# Patient Record
Sex: FEMALE | Race: WHITE | URBAN - METROPOLITAN AREA
[De-identification: names, ages, dates, MRNs, and addresses within clinical notes are randomized per-mention and may not be internally consistent; named-entity substitution may affect disease eponyms.]

---

## 2018-03-23 ENCOUNTER — DOCUMENTATION ONLY (OUTPATIENT)
Dept: SURGERY | Age: 33
End: 2018-03-23

## 2018-03-23 ENCOUNTER — OFFICE VISIT (OUTPATIENT)
Dept: SURGERY | Age: 33
End: 2018-03-23

## 2018-03-23 VITALS
WEIGHT: 175 LBS | BODY MASS INDEX: 29.16 KG/M2 | SYSTOLIC BLOOD PRESSURE: 113 MMHG | HEART RATE: 63 BPM | DIASTOLIC BLOOD PRESSURE: 68 MMHG | HEIGHT: 65 IN

## 2018-03-23 DIAGNOSIS — Z80.3 FAMILY HISTORY OF BREAST CANCER: ICD-10-CM

## 2018-03-23 DIAGNOSIS — N60.11 FIBROCYSTIC BREAST CHANGES OF BOTH BREASTS: Primary | ICD-10-CM

## 2018-03-23 DIAGNOSIS — N60.12 FIBROCYSTIC BREAST CHANGES OF BOTH BREASTS: Primary | ICD-10-CM

## 2018-03-23 DIAGNOSIS — Z84.81 FAMILY HISTORY OF BRCA2 GENE POSITIVE: ICD-10-CM

## 2018-03-23 RX ORDER — PREDNISOLONE ACETATE 10 MG/ML
1 SUSPENSION/ DROPS OPHTHALMIC 4 TIMES DAILY
COMMUNITY

## 2018-03-23 NOTE — LETTER
3/23/2018 2:26 PM 
 
Patient:  Silvestre Vizcarra YOB: 1985 Date of Visit: 3/23/2018 Dear Hesham Groves NP 
[address] [facility] [address] VIA Facsimile: [phone] 
 : Thank you for referring Ms. Silvestre Vizcarra to me for evaluation/treatment. Below are the relevant portions of my assessment and plan of care. ASSESSMENT and PLAN Encounter Diagnoses Name Primary?  Fibrocystic breast changes of both breasts Yes  Family history of breast cancer Normal breast exam. 
We reviewed genetic testing possibilities focusing on breast cancer causing genes (known BRCA2 mutation in her family). We discussed possible results (positive for a mutation, variant of unknown significance and negative for a mutation), reliability of these results, what these results mean in terms of her personal risk of breast, ovarian and other cancers, treatment to decrease her risk and/or increased monitoring for cancer detection, effect these results would have on family members (has three young children) and the logistics of testing. All the patient's questions and concerns were addressed and she elected to proceed with genetic testing - single site BRCA2 testing based on her mom's results. We will follow-up when the results are available. If positive, she would like to have both prophylactic mastectomy and oophorectomy. 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 and her  who is active duty may be transferred). She is comfortable with this plan. All questions answered and she stated understanding. If you have questions, please do not hesitate to call me. I look forward to following Ms. Tim Chavez along with you. Sincerely, Jong Rashid NP

## 2018-03-23 NOTE — PROGRESS NOTES
HISTORY OF PRESENT ILLNESS  Jonas Strickland is a 34 y.o. female. HPI   NEW patient here today referred for consultation at the request of Dr. Kevin Pruitt for high risk due to family history of breast cancer and positive gene mutation. Denies any breast problems at this time. FH includes-  Maternal great grandmother diagnosed with breast cancer. . Maternal great aunt diagnosed with breast cancer in her late 29's, still living. Tested positive for BRCA 2 gene mutation. 2 Maternal 2nd cousins (great aunt's daughters) diagnosed with ovarian cancer, both currently receiving treatment. Maternal great uncle diagnosed with breast cancer, still living. Maternal great uncle diagnosed with breast and prostate cancer, . Mother with no known cancer. Tested positive for BRCA 2 gene mutation- [id number]    No history of breast imaging. Review of Systems   Constitutional: Negative. HENT: Negative. Eyes: Negative. Respiratory: Negative. Cardiovascular: Negative. Gastrointestinal: Negative. Genitourinary: Negative. Musculoskeletal: Positive for joint pain. Skin: Negative. Neurological: Negative. Endo/Heme/Allergies: Negative. Psychiatric/Behavioral: Negative.         Physical Exam    ASSESSMENT and PLAN  {ASSESSMENT/PLAN:76268}

## 2018-03-23 NOTE — PATIENT INSTRUCTIONS
Breast Cancer (BRCA) Gene Testing: Care Instructions  Your Care Instructions    BRCA1 and BRCA2 are genes that help control normal cell growth. Sometimes, people inherit changes in one of these genes. These changes are called mutations. If you inherit a BRCA (say \"[place]\") mutation, you have a greater risk of breast or ovarian cancer. You also have a higher risk of breast or ovarian cancer if you have a family history of them. Some people have a family history, but they don't have the BRCA mutation. To find out if you have the BRCA mutation, you can have a blood test. People who have the mutation have a higher risk for these cancers. But not everyone with the mutation gets cancer. Talk to your doctor about things that may increase your risk. Let your doctor know if you or a family member had or has:  · A positive test for BRCA. · Breast cancer before age 48. · Ovarian cancer at any age. · Male breast cancer. · Breast cancer in both breasts. · Both breast and ovarian cancer. · [place]. Your doctor may also want you to talk with a genetic counselor. The counselor can help you understand what the results of this test might mean. Follow-up care is a key part of your treatment and safety. Be sure to make and go to all appointments, and call your doctor if you are having problems. It's also a good idea to know your test results and keep a list of the medicines you take. Why should you have BRCA testing? You may feel better if the test shows that you don't have a BRCA mutation. This is called a negative result. If the test shows that you do have a BRCA mutation, it's called a positive result. In this case, you may be able to make some decisions that could reduce your cancer risk. The information may also be helpful to your family and loved ones. What are the risks of BRCA testing? A negative test may give you a false sense of security.  So you may not have the regular tests that help find cancer at an early stage. But a negative BRCA test does not mean that you will never have breast or ovarian cancer. A positive test result may cause anxiety or depression. A positive BRCA test does not mean that you will definitely get breast or ovarian cancer. It's important to think carefully about what the test results could mean for you. A genetic counselor can help you do this. What can you do to reduce the risk of breast cancer? Your risk for breast cancer increases as you get older. There is no known way to prevent breast cancer. But with some cancers, finding them early can increase your chances of successful treatment. Here are some steps you can take to help reduce your risk:  · Get familiar with the look and feel of your breasts. This will help you notice any changes. Call your doctor if you notice a change. · Have regular breast exams by your doctor or nurse. Ask your doctor how often you should get them. · Have regular mammograms. A mammogram is a picture of your breast tissue. It can find changes in your breast before you can feel them. Talk to your doctor about when to get this test.  You can also help take care of yourself and reduce your risk of cancer if you:  · Stay at a healthy weight. · Eat a healthy, low-fat diet. · Get some exercise every day. If you don't usually exercise, walking is a good way to start. · Don't smoke. If you need help quitting, talk to your doctor about stop-smoking programs and medicines. These can increase your chances of quitting for good. · Drink alcohol in moderation. Or don't drink alcohol at all. · Breastfeed. There is some evidence that breastfeeding may lower the risk of breast cancer. The benefit seems to be greatest in women who have  for longer than 12 months or who  several children. Men and women who do a gene test and find out that they have a BRCA gene change have some options to manage their cancer risk.   · Women who haven't had cancer may want to think about starting breast cancer screening at a younger age, taking medicine, and having preventive surgery. · Men may want to think about doing breast self-exams, having clinical breast exams, and having prostate cancer screening. If you have a BRCA gene change, talk with your doctor. He or she will help you manage your cancer risk. Where can you learn more? Go to http://damián-aramis.info/. Enter U252 in the search box to learn more about \"Breast Cancer (BRCA) Gene Testing: Care Instructions. \"  Current as of: May 12, 2017  Content Version: 11.4  © 8269-7377 [email or web address]. Care instructions adapted under license by [employer] (which disclaims liability or warranty for this information). If you have questions about a medical condition or this instruction, always ask your healthcare professional. [address] any warranty or liability for your use of this information.

## 2018-03-23 NOTE — PROGRESS NOTES
HISTORY OF PRESENT ILLNESS  Cata Reddy is a 34 y.o. female. Other     NEW patient here today referred for consultation at the request of Dr. John Carlson for high risk due to family history of breast cancer and positive gene mutation. Denies any breast problems at this time. OB History     Obstetric Comments    Menarche:  14. LMP: current. # of Children:  3. Age at Delivery of First Child:  20.   Hysterectomy/oophorectomy:  NO/NO. Breast Bx:  no.  Hx of Breast Feeding:  yes. BCP:  yes. Hormone therapy:  no.           Past Surgical History:   Procedure Laterality Date    HX OTHER SURGICAL Left  and     orthoscopic cyst removal LEFT wrist    HX WISDOM TEETH EXTRACTION       FH includes-  Maternal great grandmother diagnosed with breast cancer. . Maternal great aunt diagnosed with breast cancer in her late 29's, still living. Tested positive for BRCA 2 gene mutation. 2 Maternal 2nd cousins (great aunt's daughters) diagnosed with ovarian cancer, both currently receiving treatment. Maternal great uncle diagnosed with breast cancer, still living. Maternal great uncle diagnosed with breast and prostate cancer, . Mother with no known cancer. Tested positive for BRCA 2 gene mutation- [id number]    No history of breast imaging. Review of Systems   Constitutional: Negative. HENT: Negative. Eyes: Negative. Respiratory: Negative. Cardiovascular: Negative. Gastrointestinal: Negative. Genitourinary: Negative. Musculoskeletal: Positive for joint pain. Skin: Negative. Neurological: Negative. Endo/Heme/Allergies: Negative. Psychiatric/Behavioral: Negative. Physical Exam   Constitutional: She appears well-developed and well-nourished. Pulmonary/Chest: Right breast exhibits no inverted nipple, no mass, no nipple discharge, no skin change and no tenderness.  Left breast exhibits no inverted nipple, no mass, no nipple discharge, no skin change and no tenderness. Breasts are symmetrical.   Musculoskeletal: Normal range of motion. UE x 2   Lymphadenopathy:     She has no cervical adenopathy. She has no axillary adenopathy. Right: No supraclavicular adenopathy present. Left: No supraclavicular adenopathy present. Skin: Skin is warm, dry and intact. Chest and breasts examined   Psychiatric: She has a normal mood and affect. Her speech is normal and behavior is normal.     Visit Vitals    /68    Pulse 63    Ht 5' 5\" (1.651 m)    Wt 175 lb (79.4 kg)    BMI 29.12 kg/m2     ASSESSMENT and PLAN  Encounter Diagnoses   Name Primary?  Fibrocystic breast changes of both breasts Yes    Family history of breast cancer      Normal breast exam.  We reviewed genetic testing possibilities focusing on breast cancer causing genes (known BRCA2 mutation in her family). We discussed possible results (positive for a mutation, variant of unknown significance and negative for a mutation), reliability of these results, what these results mean in terms of her personal risk of breast, ovarian and other cancers, treatment to decrease her risk and/or increased monitoring for cancer detection, effect these results would have on family members (has three young children) and the logistics of testing. All the patient's questions and concerns were addressed and she elected to proceed with genetic testing - single site BRCA2 testing based on her mom's results. We will follow-up when the results are available. If positive, she would like to have both prophylactic mastectomy and oophorectomy. The timing would depend on where she is living (getting ready to start a combined masters program in [facility] and her  who is active duty may be transferred). She is comfortable with this plan. All questions answered and she stated understanding.       Greater than 30 minutes was spent with this patient and greater than 50% of that time was spent in face to face counseling.

## 2018-04-03 ENCOUNTER — TELEPHONE (OUTPATIENT)
Dept: SURGERY | Age: 33
End: 2018-04-03

## 2018-04-03 NOTE — TELEPHONE ENCOUNTER
Faxed copy of genetic test results to PCP, Sofia Garcia NP. Fax #[phone]. Confirmation fax received. Also placed copy of results in mail for patient.

## 2018-04-03 NOTE — TELEPHONE ENCOUNTER
Called and spoke with patient about genetic testing results - single site BRCA2 mutation - negative. Copy of the results sent to patient and referring provider.